# Patient Record
Sex: MALE | Race: WHITE
[De-identification: names, ages, dates, MRNs, and addresses within clinical notes are randomized per-mention and may not be internally consistent; named-entity substitution may affect disease eponyms.]

---

## 2019-04-01 ENCOUNTER — HOSPITAL ENCOUNTER (EMERGENCY)
Dept: HOSPITAL 52 - LL.ED | Age: 19
Discharge: HOME | End: 2019-04-01
Payer: COMMERCIAL

## 2019-04-01 DIAGNOSIS — T15.11XA: Primary | ICD-10-CM

## 2019-04-01 RX ADMIN — TETRACAINE HYDROCHLORIDE ONE DROP: 5 SOLUTION OPHTHALMIC at 21:59

## 2019-04-01 RX ADMIN — PURIFIED WATER ONE DROP: 986 SOLUTION OPHTHALMIC at 21:59

## 2019-04-01 RX ADMIN — BACITRACIN ZINC AND POLYMYXIN B SULFATE SCH APPLIC: 500; 10000 OINTMENT OPHTHALMIC at 22:29

## 2019-04-01 NOTE — EDM.PDOC
ED HPI GENERAL MEDICAL PROBLEM





- General


Chief Complaint: Eye Problems


Stated Complaint: right eye foreign body


Time Seen by Provider: 04/01/19 21:30


Source of Information: Reports: Patient


History Limitations: Reports: No Limitations





- History of Present Illness


INITIAL COMMENTS - FREE TEXT/NARRATIVE: 





Patient is a 19-year-old was brought in by dad with foreign body right eye he 

was working with a black powder gun felt something go into the eye and then 

noticed a black dot on the inner aspect of the sclera right eye.


Onset: Today


Duration: Hour(s):, Constant


Location: Reports: Face (Eyes)


Quality: Reports: Ache


Severity: Mild


Improves with: Reports: None


Worsens with: Reports: None


Associated Symptoms: Reports: No Other Symptoms


  ** Right Eye


Pain Score (Numeric/FACES): 1





- Related Data


 Allergies











Allergy/AdvReac Type Severity Reaction Status Date / Time


 


No Known Allergies Allergy   Verified 04/01/19 21:28











Home Meds: 


 Home Meds





. [No Known Home Meds]  04/01/19 [History]











Social & Family History





- Tobacco Use


Smoking Status *Q: Never Smoker


Second Hand Smoke Exposure: No





- Caffeine Use


Caffeine Use: Reports: Coffee, Soda





- Recreational Drug Use


Recreational Drug Use: No





ED ROS GENERAL





- Review of Systems


Review Of Systems: ROS reveals no pertinent complaints other than HPI.





ED EXAM GENERAL W FULL EYE





- Physical Exam


Exam: See Below


Exam Limited By: No Limitations


General Appearance: Alert, WD/WN, No Apparent Distress


Eye Exam: Right Eye: Other (Corneal abrasion 6:00 for a body in the sclera)


Conjunctiva & Sclera: Right: Foreign Body (Foreign body sclera nasal angle)


Cornea Exam: Right: Corneal Abrasion (Small corneal abrasion 6)


Ears: Normal External Exam


Nose: Normal Inspection, Normal Mucosa, No Blood


Throat/Mouth: Normal Inspection, Normal Lips, Normal Teeth, Normal Gums, Normal 

Oropharynx, Normal Voice, No Airway Compromise


Head: Atraumatic, Normocephalic


Neck: Normal Inspection, Supple, Non-Tender, Full Range of Motion


Respiratory/Chest: No Respiratory Distress, Lungs Clear, Normal Breath Sounds, 

No Accessory Muscle Use, Chest Non-Tender


Cardiovascular: Normal Peripheral Pulses, Regular Rate, Rhythm, No Edema, No 

Gallop, No JVD, No Murmur, No Rub


GI/Abdominal: Normal Bowel Sounds, Soft, Non-Tender, No Organomegaly, No 

Distention, No Abnormal Bruit, No Mass


 (Male) Exam: No Hernia, Normal Inspection, Normal Prostate, Circumcised


 (Female) Exam: Deferred


Rectal (Males) Exam: Deferred


Rectal (Female) Exam: Deferred


Back Exam: Normal Inspection, Full Range of Motion, NT


Extremities: Normal Inspection, Normal Range of Motion, Non-Tender, Normal 

Capillary Refill, No Pedal Edema





ED EYE w/ Add Procedure





- Eye Procedure


Alcaine Drops Administered: Yes


Eye FB Removal: Removal w/ Needle, Other


Eye Irrigated w/ Saline (ccs): 10


Antibiotic Oinment/Drps Admin: Right Eye





Course





- Vital Signs


Last Recorded V/S: 





 Last Vital Signs











Temp  98.2 F   04/01/19 21:23


 


Pulse  74   04/01/19 21:23


 


Resp  20   04/01/19 21:23


 


BP  139/87   04/01/19 21:23


 


Pulse Ox  100   04/01/19 21:23














- Orders/Labs/Meds


Meds: 





Medications














Discontinued Medications














Generic Name Dose Route Start Last Admin





  Trade Name Brooks  PRN Reason Stop Dose Admin


 


Balanced Salt Solution  1 ml  04/01/19 21:56  04/01/19 21:59





  Eye Stream Eye Rinse  EYERT  04/01/19 21:57  1 drop





  ONETIME ONE   Administration





     





     





     





     


 


Tetracaine HCl  1 ml  04/01/19 21:56  04/01/19 21:59





  Tetracaine 0.5% Steri-Unit Sol  EYERT  04/01/19 21:57  1 drop





  ASDIRECTED ONE   Administration





     





     





     





     














Departure





- Departure


Time of Disposition: 22:36


Disposition: Home, Self-Care 01


Condition: Fair


Clinical Impression: 


 Corneal abrasion








- Discharge Information


*PRESCRIPTION DRUG MONITORING PROGRAM REVIEWED*: No


*COPY OF PRESCRIPTION DRUG MONITORING REPORT IN PATIENT DAVIN: No


Referrals: 


PCP,None [Primary Care Provider] - 


Care Plan Goals: 


At this time before and body was identified in the nasal angle on the right eye 

we will ahead we went ahead and removed it also noticed a corneal abrasion with 

no foreign body irrigated directly at this time we'll send him home bacitracin 

was applied and an eye patch prior to discharge